# Patient Record
Sex: FEMALE | Race: WHITE
[De-identification: names, ages, dates, MRNs, and addresses within clinical notes are randomized per-mention and may not be internally consistent; named-entity substitution may affect disease eponyms.]

---

## 2018-07-06 ENCOUNTER — HOSPITAL ENCOUNTER (OUTPATIENT)
Dept: HOSPITAL 74 - JASU-SURG | Age: 83
Discharge: HOME | End: 2018-07-06
Attending: PODIATRIST
Payer: COMMERCIAL

## 2018-07-06 VITALS — DIASTOLIC BLOOD PRESSURE: 78 MMHG | TEMPERATURE: 98.1 F | SYSTOLIC BLOOD PRESSURE: 159 MMHG

## 2018-07-06 VITALS — HEART RATE: 68 BPM

## 2018-07-06 VITALS — BODY MASS INDEX: 25.7 KG/M2

## 2018-07-06 DIAGNOSIS — M20.12: ICD-10-CM

## 2018-07-06 DIAGNOSIS — M21.612: Primary | ICD-10-CM

## 2018-07-06 PROCEDURE — 0QBR0ZZ EXCISION OF LEFT TOE PHALANX, OPEN APPROACH: ICD-10-PCS | Performed by: PODIATRIST

## 2018-07-06 NOTE — HP
Admitting History and Physical





- Primary Care Physician


PCP: Jac Banda (Podiatrist)





- Admission


Chief Complaint: Left bunion with hallux valgus


History of Present Illness: 





Patient reports inability to fit feet in shoes and pain on ambulation


History Source: Medical Record (History and Physical deferred to the PCP)





- Past Medical History


...Pregnant: No





- Smoking History


Smoking history: Never smoked


Have you smoked in the past 12 months: No





- Alcohol/Substance Use


Hx Alcohol Use: No





Home Medications





- Allergies


Allergies/Adverse Reactions: 


 Allergies











Allergy/AdvReac Type Severity Reaction Status Date / Time


 


aspirin Allergy Mild Swelling Verified 07/06/18 09:19














- Home Medications


Home Medications: 


Ambulatory Orders





Losartan 50Mg/Hctz 12.5MG [Hyzaar -] 100 mg PO DAILY 05/24/14 


Simvastatin [Zocor] 40 mg PO HS 05/24/14 


Cholecalciferol (Vitamin D3) [Vitamin D -] 500 mg PO BID 07/05/18 











Physical Examination


Vital Signs: 


 Vital Signs











Temperature  97.8 F   07/06/18 09:17


 


Pulse Rate  77   07/06/18 09:17


 


Respiratory Rate  20   07/06/18 09:17


 


Blood Pressure  142/78   07/06/18 09:17


 


O2 Sat by Pulse Oximetry (%)  98   07/06/18 09:05











Musculoskeletal: Yes: Other (35 degree hallux valgus with reducible smooth 

moving first mpj.  Large bunion is present.)





Assessment/Plan





Assessment


After re-evaluating the left bunion today, I am hoping to not remove the joint 

with tyler procedure if I can reduce the IM and joint sufficiently with simple 

bunionectomy.  I have prepared the patient for the possiblity that I might not 

remove her joint if possible.





Plan


Left simple bunionectomy with possible conversion to Tyler if reduction is not 

adequate.

## 2018-07-06 NOTE — OP
DATE OF OPERATION:  07/06/2018

 

SURGEON:  Jac Banda DPM

 

PREOPERATIVE DIAGNOSIS:  Left bunion with hallux valgus.  

 

POSTOPERATIVE DIAGNOSIS:  Left bunion with hallux valgus.

 

PROCEDURE:  Left modified Sequeira simple bunionectomy.

 

DESCRIPTION OF PROCEDURE:  Under fractional anesthesia and a surgical scrub with

Betadine scrub and solution x2, the patient was draped using sterile technique. 

After 3 minutes of left limb elevation, a left ankle tourniquet was inflated to 250

mmHg pressure for 35 minutes.  Inspection of the left foot showed extremely tight

extensor tendon and a severe hallux valgus with moderately-to-severely sized medial

bunion.  Using a No. 15 surgical blade, a linear longitudinal incision was made on

the dorsal medial aspect of the foot over the 1st metatarsophalangeal joint.  The

incision was deepened through fascia and retracted exposing the 1st

metatarsophalangeal joint capsule and the extensor martin apparatus.  Using a new No.

15 surgical blade, a 

longitudinal capsular incision was made at the 1st metatarsophalangeal joint.  Medial

and lateral sesamoidal ligaments were dissected from the 1st metatarsal head and then

the 1st metatarsal head was delivered into the wound.  Joint cartilage was inspected

and found to be normal in appearance with no fissuring or degeneration, so it was

decided not to perform the arthroplasty joint removal Tyler procedure.  Instead, a

simple Sequeira bunionectomy was chosen.

 

Using the sagittal saw, the medial eminence of the 1st metatarsal head was resected

from the wound.  Sharp edges were rasped smooth, and the wound was irrigated 0

saline.  The joint was reapproximated into a normal anatomic position and found to

have significant lateral soft tissue contracture.  Using a No. 15 blade, this soft

tissue contracture was partially released including the adductor tendon of the 1st

metatarsophalangeal joint.  The extensor tendon was found to be exceptionally tight,

so a Z-plasty lengthening was performed at the site suturing the tendon with 3-0

Vicryl suture.  Then the joint capsule was reapproximated and sutured with 3-0 Vicryl

and the extensor martin apparatus was then repositioned into a normal anatomic position

and sutured in similar fashion.  Superficial fascia was reapproximated and closed

with 4-0 Vicryl and then skin reapproximated and closed with 4-0 Vicryl subcuticular

sutures and Steri-Strips.  The ankle tourniquet was deflated after 35 minutes of

inflation.  Vascular perfusion immediately returned to all 5 digits so a dry sterile

dressing was applied to the left foot.  The patient tolerated the surgical procedure

well and left the operating room stable, alert, awake, and in no pain.

 

 

 

MIGUELANGEL FLORES/[id number]

DD: 07/06/2018 11:38

DT: 07/06/2018 12:30

Job #:  [id number]

## 2018-12-21 ENCOUNTER — HOSPITAL ENCOUNTER (EMERGENCY)
Dept: HOSPITAL 74 - JER | Age: 83
LOS: 1 days | Discharge: HOME | End: 2018-12-22
Payer: COMMERCIAL

## 2018-12-21 VITALS — BODY MASS INDEX: 28.3 KG/M2

## 2018-12-21 VITALS — HEART RATE: 78 BPM

## 2018-12-21 DIAGNOSIS — E78.00: ICD-10-CM

## 2018-12-21 DIAGNOSIS — Z86.14: ICD-10-CM

## 2018-12-21 DIAGNOSIS — I10: ICD-10-CM

## 2018-12-21 DIAGNOSIS — R10.84: Primary | ICD-10-CM

## 2018-12-21 LAB
ALBUMIN SERPL-MCNC: 3.9 G/DL (ref 3.4–5)
ALP SERPL-CCNC: 99 U/L (ref 45–117)
ALT SERPL-CCNC: 13 U/L (ref 13–61)
ANION GAP SERPL CALC-SCNC: 6 MMOL/L (ref 8–16)
APPEARANCE UR: CLEAR
AST SERPL-CCNC: 18 U/L (ref 15–37)
BASOPHILS # BLD: 0.8 % (ref 0–2)
BILIRUB SERPL-MCNC: 0.4 MG/DL (ref 0.2–1)
BILIRUB UR STRIP.AUTO-MCNC: NEGATIVE MG/DL
BUN SERPL-MCNC: 13 MG/DL (ref 7–18)
CALCIUM SERPL-MCNC: 9.1 MG/DL (ref 8.5–10.1)
CHLORIDE SERPL-SCNC: 105 MMOL/L (ref 98–107)
CO2 SERPL-SCNC: 29 MMOL/L (ref 21–32)
COLOR UR: COLORLESS
CREAT SERPL-MCNC: 0.8 MG/DL (ref 0.55–1.3)
DEPRECATED RDW RBC AUTO: 16 % (ref 11.6–15.6)
EOSINOPHIL # BLD: 2 % (ref 0–4.5)
EPITH CASTS URNS QL MICRO: (no result) /HPF
GLUCOSE SERPL-MCNC: 105 MG/DL (ref 74–106)
HCT VFR BLD CALC: 33.2 % (ref 32.4–45.2)
HGB BLD-MCNC: 11.3 GM/DL (ref 10.7–15.3)
INR BLD: 1.07 (ref 0.83–1.09)
KETONES UR QL STRIP: NEGATIVE
LEUKOCYTE ESTERASE UR QL STRIP.AUTO: NEGATIVE
LYMPHOCYTES # BLD: 23.7 % (ref 8–40)
MCH RBC QN AUTO: 30.8 PG (ref 25.7–33.7)
MCHC RBC AUTO-ENTMCNC: 34 G/DL (ref 32–36)
MCV RBC: 90.8 FL (ref 80–96)
MONOCYTES # BLD AUTO: 7.2 % (ref 3.8–10.2)
NEUTROPHILS # BLD: 66.3 % (ref 42.8–82.8)
NITRITE UR QL STRIP: NEGATIVE
PH UR: 8 [PH] (ref 5–8)
PLATELET # BLD AUTO: 428 K/MM3 (ref 134–434)
PMV BLD: 7.8 FL (ref 7.5–11.1)
POTASSIUM SERPLBLD-SCNC: 4 MMOL/L (ref 3.5–5.1)
PROT SERPL-MCNC: 6.8 G/DL (ref 6.4–8.2)
PROT UR QL STRIP: NEGATIVE
PROT UR QL STRIP: NEGATIVE
PT PNL PPP: 12.6 SEC (ref 9.7–13)
RBC # BLD AUTO: 3.66 M/MM3 (ref 3.6–5.2)
SODIUM SERPL-SCNC: 140 MMOL/L (ref 136–145)
SP GR UR: 1.02 (ref 1.01–1.03)
UROBILINOGEN UR STRIP-MCNC: NEGATIVE MG/DL (ref 0.2–1)
WBC # BLD AUTO: 6.6 K/MM3 (ref 4–10)

## 2018-12-21 NOTE — PDOC
Attending Attestation





- HPI


HPI: 





12/21/18 21:00


The patient is a 83 year old female, with a significant PMH of hypertension, 

hyperlipidemia, who presents to the emergency department for evaluation s/p 

rectal bleeding earlier today. The patient states she wiped 3x and noted red 

blood on the toilet paper. The patient also endorses 5 days of constant 

generalized abdominal pain. The patient reports recent surgery on 11/28 for 

right knee replacement for which she completed 5 out of 10 day course of 

antibiotics but stopped secondary to the abdominal pain. The patient also 

endorses decreased appetite, nausea, shortness of breath and dizziness. 


 


The patient denies chest pain, palpitations, diaphoresis, or headache.


Denies fever, chills, vomit, diarrhea and constipation.


Denies dysuria, frequency, urgency and hematuria.


 


Allergies: Aspirin 


Surgical history: Appendectomy, hemorrhoid surgery, right knee replacement on 11 /28. 


PCP: Dr Lr 





Documentation prepared by Tl Ann, acting as medical scribe for Buddy Cabrera MD.








- Physicial Exam


PE: 





12/21/18 21:05


CONSTITUTIONAL: Awake and alert. 


NECK: Supple; non-tender; no cervical lymphadenopathy


CARD: Normal S1, S2; no murmurs, rubs, or gallops


RESP: Normal chest excursion with respiration; breath sounds clear and equal 

bilaterally; no wheezes, rhonchi, or rales


ABD: (+) Suprapubic tenderness to palpation. No rebound or guarding. Soft, non-

distended; no palpable organomegaly, no palpable hernias


EXT: (+) 1+ bilateral lower extremity pitting edema with right > left. No calf 

tenderness. (+) Incision scar on right knee. Normal ROM in all four extremities

; non-tender to palpation; distal pulses intact


SKIN: Warm, dry, no rash


NEURO: No focal neurological deficiencies.








<Tl Ann - Last Filed: 12/21/18 21:15>





- Resident


Resident Name: Hemanth Mosher





- ED Attending Attestation


I have performed the following: I have examined & evaluated the patient, The 

case was reviewed & discussed with the resident, I agree w/resident's findings 

& plan, Exceptions are as noted





- Medical Decision Making





12/22/18 00:21


Patient is a well-appearing 83-year-old female who presents with mild 

suprapubic pain and several episodes of blood on the tissue paper after 

defecation. In the ER, initial evaluation, patient noted to be hypertensive 

with a normal heart rate. Serial abdominal exams reveal minimal suprapubic 

tenderness to deep palpation only no external or internal hemorrhoids are 

identified (see note by Dr. Mosher); patient is guaiac negative. H&H is at 

baseline. CT of abdomen and pelvis reveals several small fat containing hernias 

only. There is no evidence of acute intra-abdominal pathology. Patient 

tolerates by mouth. Will discharge with abdominal pain instructions and GI 

follow-up as an outpatient.





<Buddy Cabrera - Last Filed: 12/22/18 00:23>

## 2018-12-21 NOTE — PDOC
History of Present Illness





- General


Chief Complaint: Rectal Bleed


Stated Complaint: RECTAL BLEED


Time Seen by Provider: 12/21/18 19:42





- History of Present Illness


Initial Comments: 





12/21/18 21:00


An 83 y.o. F w/ PMHx. including but not exclusive to HTN, HLD and positive MRSA 

Cx. presents to the ED with rectal bleeding. Pt. states that the last 3 BMs, 

last earlier today, when she wiped she noted scant blood on her tissue paper. 

Pt. is also complaining of epigastric, LLQ, RLQ, and suprapubic pain for the 

last 5 days that has caused decreased PO intake, nausea and dizziness. PT. 

reports that her stool is formed but soft and did not notice any blood in the 

toilet bowl itself. Pt. reports having a colonoscopy 3 years ago that was 

benign and denied any mention of diverticula to her from the 

gastroenterologist. Pt. states that she recently had a right knee replacement 

on November 28th by Dr. Baldev Luna (). Pt. was prescribed 10 days 

of Abx. perioperatively but decided to stop taking them 5 days in because of 

the abdominal pain. Pt. states that she takes Percocet at home for her knee 

pain. Of note Pt. is also asking about her right knee and dressing changes. Pt. 

denies vomiting, constipation, diarrhea, fever, chills, numbness/tingling in 

extremities, dysuria, polyuria or lightheadedness.    





12/21/18 21:13


CBC, CMP, PT/INR, Type and Screen, CT-Abdomen and Pelvis -ordered to evaluate 

for diverticula vs. colitis vs. cancer. 


UA and Urine culture- ordered to evaluate for UTI


MRSA nare screen ordered to rule out ongoing MRSA colonization. 


Timing/Duration: 1 week


Severity: mild


Associated Symptoms: reports: loss of appetite, nausea/vomiting (nausea but no 

vomiting ).  denies: fever/chills





Past History





- Past Medical History


Allergies/Adverse Reactions: 


 Allergies











Allergy/AdvReac Type Severity Reaction Status Date / Time


 


aspirin Allergy Mild Swelling Verified 12/21/18 19:42











Home Medications: 


Ambulatory Orders





Losartan 50Mg/Hctz 12.5MG [Hyzaar -] 100 mg PO DAILY 05/24/14 


Simvastatin [Zocor] 40 mg PO HS 05/24/14 


Cholecalciferol (Vitamin D3) [Vitamin D -] 500 mg PO BID 07/05/18 








Anemia: No


Asthma: No


Cancer: No


Cardiac Disorders: No


CVA: No


COPD: No


CHF: No


Dementia: No


Diabetes: No


GI Disorders: No


 Disorders: No


HTN: Yes


Hypercholesterolemia: Yes


Liver Disease: No


Seizures: No


Thyroid Disease: No





- Surgical History


Abdominal Surgery: No


Appendectomy: Yes


Cardiac Surgery: No


Cholecystectomy: No


GI Surgery: Yes


Lung Surgery: No


Neurologic Surgery: No


Orthopedic Surgery: No





- Suicide/Smoking/Psychosocial Hx


Smoking History: Never smoked


Have you smoked in the past 12 months: No


Hx Alcohol Use: No


Drug/Substance Use Hx: No


Substance Use Type: None


Hx Substance Use Treatment: No





**Review of Systems





- Review of Systems


Able to Perform ROS?: Yes


Constitutional: Yes: Loss of Appetite.  No: Chills, Fever


Respiratory: Yes: See HPI


Cardiac (ROS): No: Chest Pain, Irregular Heart Rate, Lightheadedness


ABD/GI: Yes: Blood Streaked Bowels, Nausea, Poor Appetite, Poor Fluid Intake


: No: Burning, Dysuria, Discharge, Frequency, Hematuria, Incontinence, Pain


Musculoskeletal: Yes: Joint Pain


Integumentary: Yes: Erythema (right knee)


Neurological: Yes: Dizziness





*Physical Exam





- Physical Exam


General Appearance: Yes: Nourished, Appropriately Dressed


HEENT: positive: Normal ENT Inspection, Normal Voice, Symmetrical, Pharynx 

Normal, Hearing Decreased (Pt. unable to hear speaker phone for )


Respiratory/Chest: positive: Lungs Clear, Normal Breath Sounds.  negative: 

Accessory Muscle Use


Cardiovascular: positive: Regular Rhythm, Regular Rate, S1, S2, Edema (1+ 

pitting edema ).  negative: Tachycardia


Vascular Pulses: Dorsalis-Pedis (R): 2+, Doralis-Pedis (L): 2+


Gastrointestinal/Abdominal: positive: Normal Bowel Sounds, Tender, Soft, 

Tenderness (suprapubic).  negative: Organomegaly, Pulsatile Mass, Hernia, 

Hepatomegaly, Spleenomegaly


Rectal Exam: positive: heme negative stool, normal exam, normal rectal tone.  

negative: melena, hemorrhoids


Musculoskeletal: negative: CVA Tenderness


Extremity: positive: Normal Capillary Refill, Normal Inspection, Normal Range 

of Motion, Pedal Edema, Erythema.  negative: Tender, Coldness, Calf Tenderness


Integumentary: positive: Normal Color, Dry, Warm


Neurologic: positive: Normal Response, Motor Strength 5/5





ED Treatment Course





- LABORATORY


CBC & Chemistry Diagram: 


 12/21/18 21:05





 12/21/18 21:05





*DC/Admit/Observation/Transfer


Diagnosis at time of Disposition: 


 Abdominal pain, Rectal bleed








- Discharge Dispostion


Disposition: HOME


Condition at time of disposition: Stable





- Referrals


Referrals: 


Champ Lr MD [Primary Care Provider] - 





- Patient Instructions


Printed Discharge Instructions:  DI for Rectal Bleeding


Additional Instructions: 


You came in for abdominal pain and rectal bleeding.


 


We examined you and did not find any ongoing bleeding at this time. We ran labs 

which showed that your blood count is normal at this time. 


We imaged your belly and did not find any immediately concerning findings at 

this time. 





Please follow up with your Primary Care Physician within 1 week





Please return to the ER if you are experiencing ongoing bleeding, worsening 

abdominal pain, pain on urination constipation or any other concerning 

symptoms.  





- Post Discharge Activity

## 2018-12-22 VITALS — TEMPERATURE: 97.9 F | SYSTOLIC BLOOD PRESSURE: 140 MMHG | DIASTOLIC BLOOD PRESSURE: 89 MMHG

## 2024-04-10 ENCOUNTER — HOSPITAL ENCOUNTER (EMERGENCY)
Dept: HOSPITAL 74 - JER | Age: 89
Discharge: HOME | End: 2024-04-10
Payer: COMMERCIAL

## 2024-04-10 VITALS
RESPIRATION RATE: 20 BRPM | TEMPERATURE: 98.8 F | HEART RATE: 64 BPM | DIASTOLIC BLOOD PRESSURE: 62 MMHG | SYSTOLIC BLOOD PRESSURE: 141 MMHG

## 2024-04-10 VITALS — BODY MASS INDEX: 28.3 KG/M2

## 2024-04-10 DIAGNOSIS — S92.911A: Primary | ICD-10-CM
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
## 2024-04-10 DIAGNOSIS — M79.674: ICD-10-CM

## 2024-04-10 RX ADMIN — ACETAMINOPHEN ONE MG: 500 TABLET, FILM COATED ORAL at 21:49
